# Patient Record
Sex: FEMALE | Race: WHITE | NOT HISPANIC OR LATINO | Employment: FULL TIME
[De-identification: names, ages, dates, MRNs, and addresses within clinical notes are randomized per-mention and may not be internally consistent; named-entity substitution may affect disease eponyms.]

---

## 2017-07-08 ENCOUNTER — HEALTH MAINTENANCE LETTER (OUTPATIENT)
Age: 57
End: 2017-07-08

## 2022-12-02 ENCOUNTER — OFFICE VISIT (OUTPATIENT)
Dept: URGENT CARE | Facility: URGENT CARE | Age: 62
End: 2022-12-02
Payer: COMMERCIAL

## 2022-12-02 VITALS
TEMPERATURE: 98 F | WEIGHT: 141 LBS | SYSTOLIC BLOOD PRESSURE: 121 MMHG | RESPIRATION RATE: 14 BRPM | HEART RATE: 102 BPM | DIASTOLIC BLOOD PRESSURE: 80 MMHG | OXYGEN SATURATION: 99 % | BODY MASS INDEX: 24.2 KG/M2

## 2022-12-02 DIAGNOSIS — N30.01 ACUTE CYSTITIS WITH HEMATURIA: Primary | ICD-10-CM

## 2022-12-02 LAB
ALBUMIN UR-MCNC: 100 MG/DL
APPEARANCE UR: ABNORMAL
BACTERIA #/AREA URNS HPF: ABNORMAL /HPF
BILIRUB UR QL STRIP: NEGATIVE
COLOR UR AUTO: YELLOW
GLUCOSE UR STRIP-MCNC: NEGATIVE MG/DL
HGB UR QL STRIP: ABNORMAL
KETONES UR STRIP-MCNC: NEGATIVE MG/DL
LEUKOCYTE ESTERASE UR QL STRIP: ABNORMAL
NITRATE UR QL: NEGATIVE
PH UR STRIP: 6.5 [PH] (ref 5–7)
RBC #/AREA URNS AUTO: ABNORMAL /HPF
SP GR UR STRIP: 1.02 (ref 1–1.03)
UROBILINOGEN UR STRIP-ACNC: 0.2 E.U./DL
WBC #/AREA URNS AUTO: >100 /HPF

## 2022-12-02 PROCEDURE — 81001 URINALYSIS AUTO W/SCOPE: CPT | Performed by: EMERGENCY MEDICINE

## 2022-12-02 PROCEDURE — 87186 SC STD MICRODIL/AGAR DIL: CPT | Performed by: EMERGENCY MEDICINE

## 2022-12-02 PROCEDURE — 99203 OFFICE O/P NEW LOW 30 MIN: CPT | Performed by: EMERGENCY MEDICINE

## 2022-12-02 PROCEDURE — 87086 URINE CULTURE/COLONY COUNT: CPT | Performed by: EMERGENCY MEDICINE

## 2022-12-02 RX ORDER — PHENAZOPYRIDINE HYDROCHLORIDE 100 MG/1
100 TABLET, FILM COATED ORAL 3 TIMES DAILY PRN
Qty: 6 TABLET | Refills: 0 | Status: SHIPPED | OUTPATIENT
Start: 2022-12-02

## 2022-12-02 RX ORDER — CEPHALEXIN 500 MG/1
500 CAPSULE ORAL 3 TIMES DAILY
Qty: 21 CAPSULE | Refills: 0 | Status: SHIPPED | OUTPATIENT
Start: 2022-12-02 | End: 2022-12-09

## 2022-12-02 NOTE — PROGRESS NOTES
Assessment & Plan     Diagnosis:    (N30.01) Acute cystitis with hematuria  (primary encounter diagnosis)  Plan:  Urine Culture,        phenazopyridine (PYRIDIUM) 100 MG tablet,         cephALEXin (KEFLEX) 500 MG capsule      Medical Decision Making:  Marlene Jorge is a 62 year old female who presents to clinic today for evaluation of urinary frequency, urgency and dysuria.  This clinically is consistent with a urinary tract infection.  Urinalysis confirms the infection. There has been no fever, confusion, flank pain or significant abdominal pain. There is no clinical evidence of ureterolithiasis, pyelonephritis, appendicitis, colitis, diverticulitis or any intraabdominal catastrophe. The patient will be started on antibiotics for the infection. Go to the ER if increasing pain, vomiting, fever, or inability to tolerate the oral antibiotic.  Follow up with primary physician is indicated if not improving in 2-3 days.     Kade Nova PA-C  [facility] U[facility]    Subjective     Marlene Jorge is a 62 year old female who presents with  to clinic today for the following health issues:  Chief Complaint   Patient presents with     Rule out Urinary Tract Infection     Sx about a week ago, urge frequency , cloudy , pressure         HPI  Patient states that for the past week she has  experienced a burning sensation, and frequency and urgency of urination; was trying cranberry juice and that this is helping for the last couple days has been getting worse. They note that they have mild lower abdominal pain. Patient denies any flank or new back pain (has chronic left sided back pain which is unchanged), fever, nausea, vomiting, diarrhea, inability to urinate, vaginal discharge/bleeding.      Review of Systems    See HPI    Objective      Vitals:    Patient Vitals for the past 24 hrs:   BP Temp Temp src Pulse Resp SpO2 Weight   12/02/22 1716 121/80 98  F (36.7  C) Tympanic 102 14 99 % 64 kg (141 lb)         Vital  signs reviewed by: Kade Nova PA-C    Physical Exam   Constitutional: The patient is oriented to person, place, and time. Alert and cooperative. Mild acute distress.  Mouth: Mucous membranes are moist.  Cardiovascular: Regular rate and rhythm.  Pulmonary/Chest: Effort normal. No respiratory distress.   GI: Abdomen with mild suprapubic tenderness to palpation. No rebound or guarding.  Soft and nondistended throughout.  No McBurney point tenderness.   MSK: No CVA tenderness bilaterally.  Neurological: Alert and oriented x3.     Labs/Imaging:    Results for orders placed or performed in visit on 12/02/22   UA Macro with Reflex to Micro and Culture - lab collect     Status: Abnormal    Specimen: Urine, Midstream   Result Value Ref Range    Color Urine Yellow Colorless, Straw, Light Yellow, Yellow    Appearance Urine Slightly Cloudy (A) Clear    Glucose Urine Negative Negative mg/dL    Bilirubin Urine Negative Negative    Ketones Urine Negative Negative mg/dL    Specific Gravity Urine 1.025 1.003 - 1.035    Blood Urine Moderate (A) Negative    pH Urine 6.5 5.0 - 7.0    Protein Albumin Urine 100 (A) Negative mg/dL    Urobilinogen Urine 0.2 0.2, 1.0 E.U./dL    Nitrite Urine Negative Negative    Leukocyte Esterase Urine Large (A) Negative   Urine Microscopic Exam     Status: Abnormal   Result Value Ref Range    Bacteria Urine Moderate (A) None Seen /HPF    RBC Urine 10-25 (A) 0-2 /HPF /HPF    WBC Urine >100 (A) 0-5 /HPF /HPF     Urine Culture Pending    Kade Nova PA-C, December 2, 2022

## 2022-12-04 LAB — BACTERIA UR CULT: ABNORMAL

## 2023-05-11 ENCOUNTER — HOSPITAL ENCOUNTER (EMERGENCY)
Facility: CLINIC | Age: 63
Discharge: HOME | End: 2023-05-11
Attending: EMERGENCY MEDICINE | Admitting: EMERGENCY MEDICINE
Payer: COMMERCIAL

## 2023-05-11 ENCOUNTER — APPOINTMENT (OUTPATIENT)
Dept: MRI IMAGING | Facility: CLINIC | Age: 63
End: 2023-05-11
Attending: EMERGENCY MEDICINE
Payer: COMMERCIAL

## 2023-05-11 ENCOUNTER — APPOINTMENT (OUTPATIENT)
Dept: CT IMAGING | Facility: CLINIC | Age: 63
End: 2023-05-11
Attending: EMERGENCY MEDICINE
Payer: COMMERCIAL

## 2023-05-11 VITALS
HEART RATE: 90 BPM | BODY MASS INDEX: 23.9 KG/M2 | WEIGHT: 140 LBS | SYSTOLIC BLOOD PRESSURE: 132 MMHG | TEMPERATURE: 98.3 F | HEIGHT: 64 IN | OXYGEN SATURATION: 95 % | DIASTOLIC BLOOD PRESSURE: 76 MMHG | RESPIRATION RATE: 24 BRPM

## 2023-05-11 DIAGNOSIS — R51.9 ACUTE NONINTRACTABLE HEADACHE, UNSPECIFIED HEADACHE TYPE: ICD-10-CM

## 2023-05-11 PROBLEM — B18.2 CHRONIC HEPATITIS C WITHOUT HEPATIC COMA (H): Status: ACTIVE | Noted: 2018-09-06

## 2023-05-11 PROBLEM — R76.8 HEPATITIS B CORE ANTIBODY POSITIVE: Status: ACTIVE | Noted: 2022-05-04

## 2023-05-11 PROBLEM — Z87.442 HISTORY OF KIDNEY STONES: Status: ACTIVE | Noted: 2017-02-20

## 2023-05-11 LAB
ALBUMIN SERPL BCG-MCNC: 4.2 G/DL (ref 3.5–5.2)
ALP SERPL-CCNC: 120 U/L (ref 35–104)
ALT SERPL W P-5'-P-CCNC: 24 U/L (ref 10–35)
ANION GAP SERPL CALCULATED.3IONS-SCNC: 12 MMOL/L (ref 7–15)
AST SERPL W P-5'-P-CCNC: 23 U/L (ref 10–35)
BASOPHILS # BLD AUTO: 0 10E3/UL (ref 0–0.2)
BASOPHILS NFR BLD AUTO: 1 %
BILIRUB SERPL-MCNC: 0.4 MG/DL
BUN SERPL-MCNC: 10.2 MG/DL (ref 8–23)
CALCIUM SERPL-MCNC: 9.4 MG/DL (ref 8.8–10.2)
CHLORIDE SERPL-SCNC: 101 MMOL/L (ref 98–107)
CREAT SERPL-MCNC: 0.67 MG/DL (ref 0.51–0.95)
DEPRECATED HCO3 PLAS-SCNC: 22 MMOL/L (ref 22–29)
EOSINOPHIL # BLD AUTO: 0 10E3/UL (ref 0–0.7)
EOSINOPHIL NFR BLD AUTO: 1 %
ERYTHROCYTE [DISTWIDTH] IN BLOOD BY AUTOMATED COUNT: 12.6 % (ref 10–15)
GFR SERPL CREATININE-BSD FRML MDRD: >90 ML/MIN/1.73M2
GLUCOSE SERPL-MCNC: 111 MG/DL (ref 70–99)
HCT VFR BLD AUTO: 41.8 % (ref 35–47)
HGB BLD-MCNC: 13.9 G/DL (ref 11.7–15.7)
HOLD SPECIMEN: NORMAL
HOLD SPECIMEN: NORMAL
IMM GRANULOCYTES # BLD: 0 10E3/UL
IMM GRANULOCYTES NFR BLD: 0 %
LYMPHOCYTES # BLD AUTO: 0.5 10E3/UL (ref 0.8–5.3)
LYMPHOCYTES NFR BLD AUTO: 11 %
MCH RBC QN AUTO: 28.3 PG (ref 26.5–33)
MCHC RBC AUTO-ENTMCNC: 33.3 G/DL (ref 31.5–36.5)
MCV RBC AUTO: 85 FL (ref 78–100)
MONOCYTES # BLD AUTO: 0.9 10E3/UL (ref 0–1.3)
MONOCYTES NFR BLD AUTO: 17 %
NEUTROPHILS # BLD AUTO: 3.7 10E3/UL (ref 1.6–8.3)
NEUTROPHILS NFR BLD AUTO: 70 %
NRBC # BLD AUTO: 0 10E3/UL
NRBC BLD AUTO-RTO: 0 /100
PLATELET # BLD AUTO: 138 10E3/UL (ref 150–450)
POTASSIUM SERPL-SCNC: 4.2 MMOL/L (ref 3.4–5.3)
PROT SERPL-MCNC: 6.8 G/DL (ref 6.4–8.3)
RBC # BLD AUTO: 4.92 10E6/UL (ref 3.8–5.2)
SODIUM SERPL-SCNC: 135 MMOL/L (ref 136–145)
WBC # BLD AUTO: 5.2 10E3/UL (ref 4–11)

## 2023-05-11 PROCEDURE — 99284 EMERGENCY DEPT VISIT MOD MDM: CPT | Performed by: EMERGENCY MEDICINE

## 2023-05-11 PROCEDURE — A9585 GADOBUTROL INJECTION: HCPCS | Performed by: EMERGENCY MEDICINE

## 2023-05-11 PROCEDURE — 36415 COLL VENOUS BLD VENIPUNCTURE: CPT | Performed by: EMERGENCY MEDICINE

## 2023-05-11 PROCEDURE — 255N000002 HC RX 255 OP 636: Performed by: EMERGENCY MEDICINE

## 2023-05-11 PROCEDURE — 80053 COMPREHEN METABOLIC PANEL: CPT | Performed by: EMERGENCY MEDICINE

## 2023-05-11 PROCEDURE — 85014 HEMATOCRIT: CPT | Performed by: EMERGENCY MEDICINE

## 2023-05-11 PROCEDURE — 70553 MRI BRAIN STEM W/O & W/DYE: CPT

## 2023-05-11 PROCEDURE — 70450 CT HEAD/BRAIN W/O DYE: CPT | Mod: XU

## 2023-05-11 PROCEDURE — 70496 CT ANGIOGRAPHY HEAD: CPT

## 2023-05-11 PROCEDURE — 99285 EMERGENCY DEPT VISIT HI MDM: CPT | Mod: 25

## 2023-05-11 PROCEDURE — 70498 CT ANGIOGRAPHY NECK: CPT

## 2023-05-11 PROCEDURE — 250N000011 HC RX IP 250 OP 636: Performed by: EMERGENCY MEDICINE

## 2023-05-11 PROCEDURE — 96374 THER/PROPH/DIAG INJ IV PUSH: CPT | Mod: 59

## 2023-05-11 PROCEDURE — 258N000003 HC RX IP 258 OP 636: Performed by: EMERGENCY MEDICINE

## 2023-05-11 PROCEDURE — 250N000009 HC RX 250: Performed by: EMERGENCY MEDICINE

## 2023-05-11 PROCEDURE — 96361 HYDRATE IV INFUSION ADD-ON: CPT

## 2023-05-11 RX ORDER — GADOBUTROL 604.72 MG/ML
6 INJECTION INTRAVENOUS ONCE
Status: COMPLETED | OUTPATIENT
Start: 2023-05-11 | End: 2023-05-11

## 2023-05-11 RX ORDER — FLUTICASONE PROPIONATE 50 MCG
SPRAY, SUSPENSION (ML) NASAL
COMMUNITY
Start: 2023-01-26

## 2023-05-11 RX ORDER — DROPERIDOL 2.5 MG/ML
2.5 INJECTION, SOLUTION INTRAMUSCULAR; INTRAVENOUS ONCE
Status: COMPLETED | OUTPATIENT
Start: 2023-05-11 | End: 2023-05-11

## 2023-05-11 RX ORDER — ALBUTEROL SULFATE 90 UG/1
2 AEROSOL, METERED RESPIRATORY (INHALATION)
COMMUNITY
Start: 2023-02-19

## 2023-05-11 RX ORDER — IOPAMIDOL 755 MG/ML
68 INJECTION, SOLUTION INTRAVASCULAR ONCE
Status: COMPLETED | OUTPATIENT
Start: 2023-05-11 | End: 2023-05-11

## 2023-05-11 RX ADMIN — GADOBUTROL 6 ML: 604.72 INJECTION INTRAVENOUS at 15:35

## 2023-05-11 RX ADMIN — SODIUM CHLORIDE 100 ML: 9 INJECTION, SOLUTION INTRAVENOUS at 11:28

## 2023-05-11 RX ADMIN — DROPERIDOL 2.5 MG: 2.5 INJECTION, SOLUTION INTRAMUSCULAR; INTRAVENOUS at 10:59

## 2023-05-11 RX ADMIN — SODIUM CHLORIDE 1000 ML: 9 INJECTION, SOLUTION INTRAVENOUS at 09:38

## 2023-05-11 RX ADMIN — IOPAMIDOL 68 ML: 755 INJECTION, SOLUTION INTRAVENOUS at 11:27

## 2023-05-11 ASSESSMENT — ACTIVITIES OF DAILY LIVING (ADL)
ADLS_ACUITY_SCORE: 35

## 2023-05-11 NOTE — ED NOTES
MRI called regarding a racheal silver ring on patient's right hand.  Per MRI tech-okay to remain in place.

## 2023-05-11 NOTE — DISCHARGE INSTRUCTIONS
I am glad that your headache improved with medication.    MRI of your brain did not show any acute abnormalities and no signs of stroke.  You do have what are called chronic microvascular changes which are a common finding as we age.  I would like you to follow-up with your primary care provider regarding this and to make sure that you are on optimal medical management i.e., blood pressure control, cholesterol control, blood sugar control, smoking sensation etc.    Try to stay well hydrated at home. Please return to the emergency department if you develop worsening of your headache or a new headache which is severe, associated with vision changes, associated with neck stiffness or fever, or if it is different from any other headache that you have had before. Return to the emergency department if you develop numbness, weakness or tingling or problems with coordination, or if you develop severe nausea and vomiting and are unable to keep down fluids at home.

## 2023-05-11 NOTE — ED TRIAGE NOTES
Hx of migraines, this feels different, worse headache ever, not sensitive to light, usually optic migraines.     Triage Assessment     Row Name 05/11/23 0927       Triage Assessment (Adult)    Airway WDL WDL       Cardiac WDL    Cardiac WDL WDL       Cognitive/Neuro/Behavioral WDL    Cognitive/Neuro/Behavioral WDL X;all    Level of Consciousness alert    Arousal Level opens eyes spontaneously    Orientation oriented x 4    Speech clear;spontaneous;logical    Mood/Behavior anxious;hyperactive (agitated, impulsive)       Leatha Coma Scale    Best Eye Response 4-->(E4) spontaneous    Best Motor Response 6-->(M6) obeys commands    Best Verbal Response 5-->(V5) oriented    Leatha Coma Scale Score 15

## 2023-05-11 NOTE — ED PROVIDER NOTES
History     Chief Complaint   Patient presents with     Headache     Hx of migraines, this feels different, worse headache ever, not sensitive to light, usually optic migraines.     HPI  Marlene Jorge is a 62 year old female with history of tobacco use, anxiety/depression, chronic hepatitis C, who presents with headache.  Reports is the worst headache she has ever had.  Says she has a history of migraine headaches but is not prescribed any medications for them.  Unclear if she has had a formal diagnosis from a neurologist but I do not see this on her problem list that is available to me.  Headache started 2 days ago in the morning roughly 48 hours ago.  Mostly frontal and crown with the right side greater than the left.  10 out of 10 in severity and achieved maximal at onset quickly.  Has spikes of intensity.  Has not taken any medications at home for her headache.  She is sensitive to light and feels nauseated but no vomiting.  Also sensitive to loud noises.  No fevers.  No neck pain or stiffness.  Not on anticoagulants.  No imbalance, incoordination or trouble walking.  No reported numbness or weakness.    The patient's PMHx, Surgical Hx, Allergies, and Medications were all reviewed with the patient.    Allergies:  No Known Allergies    Problem List:    Patient Active Problem List    Diagnosis Date Noted     Moderate major depression (H) 05/04/2010     Priority: High     Hepatitis B core antibody positive 05/04/2022     Priority: Medium     Chronic hepatitis C without hepatic coma (H) 09/06/2018     Priority: Medium     History of kidney stones 02/20/2017     Priority: Medium     Anxiety 03/13/2012     Priority: Medium     Zoloft caused mental side effects. Celexa caused a rash.        Dermatitis 12/19/2011     Priority: Medium     (Problem list name updated by automated process. Provider to review and confirm.)       CARDIOVASCULAR SCREENING; LDL GOAL LESS THAN 160 10/31/2010     Priority: Medium     Nasal  "bones, closed fracture 2007     Priority: Medium     Lumbago 2007     Priority: Medium        Past Medical History:    No past medical history on file.    Past Surgical History:    Past Surgical History:   Procedure Laterality Date     SURGICAL HISTORY OF -   age 43    breast augmentation     SURGICAL HISTORY OF -       exploratory laparatomy for ovarian cysts     SURGICAL HISTORY OF -       nose surgery     TUBAL LIGATION         Family History:    Family History   Problem Relation Age of Onset     Psychotic Disorder Mother         manic, schizoaffective depression     C.A.D. Father         chf  age 65     C.A.D. Brother          age 52 in [place]     C.A.D. Daughter         17  heart anonomaly, had tricuspid artesia     Neurologic Disorder Son         hydrocephalis dies age 11       Social History:  Marital Status:  Single [1]  Social History     Tobacco Use     Smoking status: Every Day     Packs/day: 1.00     Years: 25.00     Pack years: 25.00     Types: Cigarettes     Smokeless tobacco: Never   Substance Use Topics     Alcohol use: Yes     Comment: 2 beer a week     Drug use: No        Medications:    albuterol (PROAIR HFA/PROVENTIL HFA/VENTOLIN HFA) 108 (90 Base) MCG/ACT inhaler  ADVIL 200 MG PO CAPS  fluticasone (FLONASE) 50 MCG/ACT nasal spray  phenazopyridine (PYRIDIUM) 100 MG tablet  predniSONE (DELTASONE) 20 MG tablet          Review of Systems  Pertinent positives and negatives mentioned in HPI    Physical Exam   BP: (!) 112/93  Pulse: 89  Temp: 98.3  F (36.8  C)  Resp: 18  Height: 162.6 cm (5' 4\")  Weight: 63.5 kg (140 lb)  SpO2: 98 %    Physical Exam  GEN: Awake, alert, and cooperative.  Appears acutely distressed 2/2 pain.   HENT: MMM. External ears and nose normal bilaterally. Atraumatic.   EYES: EOM intact. Conjunctiva clear. No discharge. No RAPD. No nystagmus.   NECK: Symmetric, freely mobile. Non tender.   CV : extremities warm and well perfused.   PULM: Normal " effort. Speaking in full sentences.   NEURO: Mental status: Alert, awake. Oriented to self, date, and place. Normal speech and language. GCS 15  Cranial Nerves: II-XII grossly intact  Motor: Follows commands x 4 extremities   Upper Extremities:   RUE: 5/5 shoulder abduction. 5/5 elbow flex/ext. 5/5 wrist flex/ext. 5/5 hand .   LUE: 5/5 shoulder abduction. 5/5 elbow flex/ext. 5/5 wrist flex/ext. 5/5 hand .    Lower Extremities:   RLE: 5/5 hip flexion. 5/5 knee flex/ext. 5/5 ankle plantar-/dorsiflexion. 5/5 EHL.   LLE: 5/5 hip flexion. 5/5 knee flex/ext. 5/5 ankle plantar-/dorsiflexion. 5/5 EHL   Sensory: Sensation intact in all 4 extremities   Coordination: Finger-nose finger intact.   EXT: No gross deformity. Warm and well perfused.  INT: Warm. No diaphoresis. Normal color.        ED Course        Procedures       Critical Care time:  none     EKG: Interpreted myself.  Sinus rhythm with rate 93 bpm.  Normal QT segment.  Normal CO and QRS duration.  Normal axis.  Delayed R wave progression.  No prior EKG for comparison.  ST segment ovation's or depressions.  Impression sinus rhythm with normal QT interval.            Results for orders placed or performed during the hospital encounter of 05/11/23 (from the past 24 hour(s))   Comprehensive metabolic panel   Result Value Ref Range    Sodium 135 (L) 136 - 145 mmol/L    Potassium 4.2 3.4 - 5.3 mmol/L    Chloride 101 98 - 107 mmol/L    Carbon Dioxide (CO2) 22 22 - 29 mmol/L    Anion Gap 12 7 - 15 mmol/L    Urea Nitrogen 10.2 8.0 - 23.0 mg/dL    Creatinine 0.67 0.51 - 0.95 mg/dL    Calcium 9.4 8.8 - 10.2 mg/dL    Glucose 111 (H) 70 - 99 mg/dL    Alkaline Phosphatase 120 (H) 35 - 104 U/L    AST 23 10 - 35 U/L    ALT 24 10 - 35 U/L    Protein Total 6.8 6.4 - 8.3 g/dL    Albumin 4.2 3.5 - 5.2 g/dL    Bilirubin Total 0.4 <=1.2 mg/dL    GFR Estimate >90 >60 mL/min/1.73m2   [place] Draw    Narrative    The following orders were created for panel order [place]  Draw.  Procedure                               Abnormality         Status                     ---------                               -----------         ------                     Extra Blue Top Tube[[id number]]                              Final result               Extra Red Top Tube[[id number]]                               Final result                 Please view results for these tests on the individual orders.   Extra Red Top Tube   Result Value Ref Range    Hold Specimen [facility]    CBC with platelets, differential    Narrative    The following orders were created for panel order CBC with platelets, differential.  Procedure                               Abnormality         Status                     ---------                               -----------         ------                     CBC with platelets and d...[[id number]]  Abnormal            Final result                 Please view results for these tests on the individual orders.   CBC with platelets and differential   Result Value Ref Range    WBC Count 5.2 4.0 - 11.0 10e3/uL    RBC Count 4.92 3.80 - 5.20 10e6/uL    Hemoglobin 13.9 11.7 - 15.7 g/dL    Hematocrit 41.8 35.0 - 47.0 %    MCV 85 78 - 100 fL    MCH 28.3 26.5 - 33.0 pg    MCHC 33.3 31.5 - 36.5 g/dL    RDW 12.6 10.0 - 15.0 %    Platelet Count 138 (L) 150 - 450 10e3/uL    % Neutrophils 70 %    % Lymphocytes 11 %    % Monocytes 17 %    % Eosinophils 1 %    % Basophils 1 %    % Immature Granulocytes 0 %    NRBCs per 100 WBC 0 <1 /100    Absolute Neutrophils 3.7 1.6 - 8.3 10e3/uL    Absolute Lymphocytes 0.5 (L) 0.8 - 5.3 10e3/uL    Absolute Monocytes 0.9 0.0 - 1.3 10e3/uL    Absolute Eosinophils 0.0 0.0 - 0.7 10e3/uL    Absolute Basophils 0.0 0.0 - 0.2 10e3/uL    Absolute Immature Granulocytes 0.0 <=0.4 10e3/uL    Absolute NRBCs 0.0 10e3/uL   Extra Blue Top Tube   Result Value Ref Range    Hold Specimen [facility]    Head CT w/o contrast    Narrative    CT SCAN OF THE HEAD WITHOUT CONTRAST   5/11/2023 11:46 AM      HISTORY: Worst headache of life, acute onset two days ago, nonfocal  neurological exam.    TECHNIQUE: Axial images of the head and coronal reformations without  IV contrast material. Radiation dose for this scan was reduced using  automated exposure control, adjustment of the mA and/or kV according  to patient size, or iterative reconstruction technique.    COMPARISON: None.      Impression    IMPRESSION: No evidence of hemorrhage. Probable small right basal  ganglia/subinsular infarct (series 3 image 15), age indeterminate.  Possible small left occipital lobe infarcts (series 3 image 15, 18),  age indeterminate. MRI could be performed if indicated. Background of  volume loss and white matter hypoattenuation which likely represents  chronic small vessel ischemic change. No acute osseous abnormality.  Multiple scattered small nonspecific calvarial lucencies, presumably  benign.    MARIE HAWKINS MD         SYSTEM ID:  [id number]   CTA Head Neck with Contrast    Narrative    CT ANGIOGRAM OF THE HEAD AND NECK WITH CONTRAST May 11, 2023 11:47 AM     HISTORY: Worst headache of life, frontal and crown w/ R>L, onset two  days ago.     TECHNIQUE: CT angiography with an injection of 68 mL Isovue-370 IV  with scans through the head and neck. Images were transferred to a  separate 3-D workstation where multiplanar reformations and 3-D images  were created. Estimates of carotid stenoses are made relative to the  distal internal carotid artery diameters except as noted. Radiation  dose for this scan was reduced using automated exposure control,  adjustment of the mA and/or kV according to patient size, or iterative  reconstruction technique.    COMPARISON: None.     CT ANGIOGRAM HEAD FINDINGS: No evidence of large vessel occlusion,  high-grade stenosis, aneurysm, or vascular malformation. Incidental  azygos configuration of the right A2 segment.    CT ANGIOGRAM NECK FINDINGS: No evidence of large vessel occlusion,  high-grade  stenosis, or dissection.     INCIDENTAL FINDINGS: Presumed atelectasis at the lung apices. Cervical  spine degenerative changes.      Impression    IMPRESSION:   CTA Head: No evidence of large vessel occlusion or high-grade  stenosis.   CTA Neck: No evidence of large vessel occlusion or high-grade  stenosis.     MARIE HAWKINS MD         SYSTEM ID:  [id number]   MR Brain w/o & w Contrast    Narrative    MRI OF THE BRAIN WITHOUT AND WITH CONTRAST  5/11/2023 4:04 PM     HISTORY: Probable small right basal ganglia/subinsular infarct, age  indeterminate and possible small left occipital lobe infarcts on non  contrast head CT.    COMPARISON: Head CT 5/11/2023.    TECHNIQUE: Axial diffusion-weighted with ADC map, T2-weighted with fat  saturation, T1-weighted and turboFLAIR and coronal T1-weighted images  of the brain were obtained without intravenous contrast. Following  Gadavist 6 mL IV,  axial turboFLAIR and coronal T1-weighted images of  the brain were obtained.     FINDINGS:   INTRACRANIAL CONTENTS: No acute or subacute infarct. No mass, acute  hemorrhage, or extra-axial fluid collections. Scattered nonspecific T2  FLAIR hyperintensities within the cerebral white matter and frankie, most  consistent with mild-to-moderate chronic microvascular ischemic  change. Mild generalized parenchymal volume loss. No hydrocephalus.  Normal position of the cerebellar tonsils. No pathologic enhancement.    SELLA: No significant abnormality accounting for technique.    OSSEOUS STRUCTURES/SOFT TISSUES: No aggressive osseous lesion  involving the calvarium, skull base, or visualized upper cervical  spine. The major intracranial vascular flow voids are maintained.    ORBITS: No significant abnormality accounting for technique.    SINUSES/MASTOIDS: No significant paranasal sinus mucosal disease. No  significant middle ear or mastoid effusion.       Impression    IMPRESSION:  1.  No acute intracranial abnormality.  2.  Mild to moderate  chronic microvascular ischemic change and mild  generalized volume loss.    ELÍAS DE OLIVEIRA MD         SYSTEM ID:  [id number]       Medications   0.9% sodium chloride BOLUS (0 mLs Intravenous Stopped 5/11/23 1525)   droperidol (INAPSINE) injection 2.5 mg (2.5 mg Intravenous $Given 5/11/23 1059)   iopamidol (ISOVUE-370) solution 68 mL (68 mLs Intravenous $Given 5/11/23 1127)   sodium chloride 0.9 % bag 500mL for CT scan flush use (100 mLs Intravenous $Given 5/11/23 1128)   gadobutrol (GADAVIST) injection 6 mL (6 mLs Intravenous $Given 5/11/23 1535)       Assessments & Plan (with Medical Decision Making)   62 year old female with past medical history of tobacco use, hepatitis C, report of migraine headaches (last documented and not on migraine medications, with 2 days of worst headache of life that was acute in onset associated with nausea photophobia and phonophobia.  Patient has not taken anything for the pain and it is just more than she can handle.  She appeared acutely distressed secondary to pain but did not appear ill or toxic.  She was lying in a darkened room.  Did not like with lights came on.  Nonfocal neurologic exam.  Will get imaging of CT head to look for intracranial hemorrhage unfortunately with onset of 48 hours not sensitive enough to rule out subarachnoid hemorrhage.  Will obtain CTA of head and neck to help further evaluate for possible SAH.  EKG obtained and normal QT segment length.  We will treat with 1 L of IV fluids and 2.5 mg of droperidol.    CT head with no signs of bleeding however probable small right basal ganglia/subinsular infarction and possible small left occipital lobe infarcts which are age-indeterminate.  CTA without signs of aneurysm.  MR imaging obtained and no acute pathology identified.  Chronic microvascular changes noted.  Patient's headache and symptoms had resolved.  Given reassuring evaluation I think she is appropriate for discharge.  I like her to follow-up with her  primary care provider for routine health maintenance and assistance with smoking cessation, ensure that her cholesterol, blood pressure, blood glucose and modifiable factors are optimized.  She states that she does not have a primary care provider and therefore a referral for follow-up was requested through epic.      I have reviewed the nursing notes.         New Prescriptions    No medications on file       Final diagnoses:   Acute nonintractable headache, unspecified headache type     Ramin Antony MD    5/11/2023   [facility] EMERGENCY DEPT    Disclaimer: This note consists of words and symbols derived from keyboarding and dictation using voice recognition software.  As a result, there may be errors that have gone undetected.  Please consider this when interpreting information found in this note.             Ramin Antony MD  05/11/23 9224       Ramin Antony MD  05/11/23 8252

## 2024-04-06 ENCOUNTER — HEALTH MAINTENANCE LETTER (OUTPATIENT)
Age: 64
End: 2024-04-06

## 2025-04-13 ENCOUNTER — HEALTH MAINTENANCE LETTER (OUTPATIENT)
Age: 65
End: 2025-04-13